# Patient Record
Sex: FEMALE | Race: WHITE | NOT HISPANIC OR LATINO | URBAN - METROPOLITAN AREA
[De-identification: names, ages, dates, MRNs, and addresses within clinical notes are randomized per-mention and may not be internally consistent; named-entity substitution may affect disease eponyms.]

---

## 2025-01-17 ENCOUNTER — APPOINTMENT (OUTPATIENT)
Dept: PRIMARY CARE | Facility: CLINIC | Age: 25
End: 2025-01-17
Payer: COMMERCIAL

## 2025-01-31 ENCOUNTER — OFFICE VISIT (OUTPATIENT)
Dept: PRIMARY CARE | Facility: CLINIC | Age: 25
End: 2025-01-31
Payer: COMMERCIAL

## 2025-01-31 VITALS
HEIGHT: 68 IN | HEART RATE: 68 BPM | SYSTOLIC BLOOD PRESSURE: 102 MMHG | WEIGHT: 158 LBS | DIASTOLIC BLOOD PRESSURE: 68 MMHG | RESPIRATION RATE: 18 BRPM | OXYGEN SATURATION: 99 % | BODY MASS INDEX: 23.95 KG/M2 | TEMPERATURE: 97.9 F

## 2025-01-31 DIAGNOSIS — Z11.1 TUBERCULOSIS SCREENING: ICD-10-CM

## 2025-01-31 DIAGNOSIS — Z01.84 IMMUNITY STATUS TESTING: Primary | ICD-10-CM

## 2025-01-31 PROCEDURE — 3008F BODY MASS INDEX DOCD: CPT | Performed by: NURSE PRACTITIONER

## 2025-01-31 PROCEDURE — 99385 PREV VISIT NEW AGE 18-39: CPT | Performed by: NURSE PRACTITIONER

## 2025-01-31 PROCEDURE — 1036F TOBACCO NON-USER: CPT | Performed by: NURSE PRACTITIONER

## 2025-01-31 RX ORDER — LEVONORGESTREL 52 MG/1
1 INTRAUTERINE DEVICE INTRAUTERINE ONCE
COMMUNITY

## 2025-01-31 ASSESSMENT — ENCOUNTER SYMPTOMS
EYES NEGATIVE: 1
ALLERGIC/IMMUNOLOGIC NEGATIVE: 1
GASTROINTESTINAL NEGATIVE: 1
CARDIOVASCULAR NEGATIVE: 1
MUSCULOSKELETAL NEGATIVE: 1
ENDOCRINE NEGATIVE: 1
HEADACHES: 1
RESPIRATORY NEGATIVE: 1
HEMATOLOGIC/LYMPHATIC NEGATIVE: 1
CONSTITUTIONAL NEGATIVE: 1
PSYCHIATRIC NEGATIVE: 1

## 2025-01-31 ASSESSMENT — LIFESTYLE VARIABLES
HOW MANY STANDARD DRINKS CONTAINING ALCOHOL DO YOU HAVE ON A TYPICAL DAY: 3 OR 4
HOW OFTEN DURING THE LAST YEAR HAVE YOU FAILED TO DO WHAT WAS NORMALLY EXPECTED FROM YOU BECAUSE OF DRINKING: NEVER
HAS A RELATIVE, FRIEND, DOCTOR, OR ANOTHER HEALTH PROFESSIONAL EXPRESSED CONCERN ABOUT YOUR DRINKING OR SUGGESTED YOU CUT DOWN: NO
HOW OFTEN DURING THE LAST YEAR HAVE YOU NEEDED AN ALCOHOLIC DRINK FIRST THING IN THE MORNING TO GET YOURSELF GOING AFTER A NIGHT OF HEAVY DRINKING: NEVER
HOW OFTEN DURING THE LAST YEAR HAVE YOU FOUND THAT YOU WERE NOT ABLE TO STOP DRINKING ONCE YOU HAD STARTED: NEVER
HOW OFTEN DO YOU HAVE SIX OR MORE DRINKS ON ONE OCCASION: LESS THAN MONTHLY
HOW OFTEN DURING THE LAST YEAR HAVE YOU BEEN UNABLE TO REMEMBER WHAT HAPPENED THE NIGHT BEFORE BECAUSE YOU HAD BEEN DRINKING: NEVER
HOW OFTEN DO YOU HAVE A DRINK CONTAINING ALCOHOL: 2-4 TIMES A MONTH
HAVE YOU OR SOMEONE ELSE BEEN INJURED AS A RESULT OF YOUR DRINKING: NO
HOW OFTEN DURING THE LAST YEAR HAVE YOU HAD A FEELING OF GUILT OR REMORSE AFTER DRINKING: NEVER
AUDIT TOTAL SCORE: 4
SKIP TO QUESTIONS 9-10: 0
AUDIT-C TOTAL SCORE: 4

## 2025-01-31 ASSESSMENT — PATIENT HEALTH QUESTIONNAIRE - PHQ9
1. LITTLE INTEREST OR PLEASURE IN DOING THINGS: NOT AT ALL
SUM OF ALL RESPONSES TO PHQ9 QUESTIONS 1 AND 2: 0
2. FEELING DOWN, DEPRESSED OR HOPELESS: NOT AT ALL

## 2025-01-31 ASSESSMENT — PAIN SCALES - GENERAL: PAINLEVEL_OUTOF10: 0-NO PAIN

## 2025-01-31 NOTE — PROGRESS NOTES
"Chief Complaint  Brandi Bey is a 24 y.o. female presenting for \"Annual Exam (Needs Hep B titer, /TB titer needs to be done in July).\"    HPI     Brandi Bey is a 24 y.o. female presenting for a school wellness exam no concerns needs hep b titer and t spot done in July.  Some anxiety, past year or two getting worse.  Sometimes affects life.  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        Past Medical History  There are no active problems to display for this patient.       Medications  Current Outpatient Medications   Medication Instructions    levonorgestrel (Mirena) 20 mcg/24hr IUD 1 each, Once        Surgical History  She has no past surgical history on file.     Social History  She reports that she has never smoked. She has never been exposed to tobacco smoke. She has never used smokeless tobacco. She reports current alcohol use. She reports that she does not use drugs.    Family History  Family History   Problem Relation Name Age of Onset    No Known Problems Mother      No Known Problems Father          Allergies  Patient has no known allergies.    ROS  Review of Systems   Constitutional: Negative.    HENT: Negative.     Eyes: Negative.    Respiratory: Negative.     Cardiovascular: Negative.    Gastrointestinal: Negative.    Endocrine: Negative.    Genitourinary: Negative.    Musculoskeletal: Negative.    Skin: Negative.    Allergic/Immunologic: Negative.    Neurological:  Positive for headaches.   Hematological: Negative.    Psychiatric/Behavioral: Negative.          Last Recorded Vitals  /68 (BP Location: Right arm, Patient Position: Sitting, BP Cuff Size: Small adult)   Pulse 68   Temp 36.6 °C (97.9 °F)   Resp 18   Wt 71.7 kg (158 lb)   SpO2 99%     Physical Exam  Vitals and nursing note reviewed.   Constitutional:       Appearance: Normal appearance.   HENT:      Head: Normocephalic and atraumatic.      Right Ear: Tympanic membrane, ear canal and external ear normal.      Left " Ear: Tympanic membrane, ear canal and external ear normal.      Nose: Nose normal.      Mouth/Throat:      Mouth: Mucous membranes are moist.      Pharynx: Oropharynx is clear.   Eyes:      Extraocular Movements: Extraocular movements intact.      Conjunctiva/sclera: Conjunctivae normal.      Pupils: Pupils are equal, round, and reactive to light.   Neck:      Thyroid: No thyromegaly.   Cardiovascular:      Rate and Rhythm: Normal rate and regular rhythm.      Pulses: Normal pulses.      Heart sounds: Normal heart sounds, S1 normal and S2 normal.   Pulmonary:      Effort: Pulmonary effort is normal.      Breath sounds: Normal breath sounds. No wheezing or rhonchi.   Abdominal:      General: Bowel sounds are normal.      Palpations: Abdomen is soft. There is no mass.      Tenderness: There is no abdominal tenderness. There is no guarding.   Genitourinary:     Comments: Not examined  Musculoskeletal:         General: Normal range of motion.      Cervical back: Normal range of motion.      Right lower leg: No edema.      Left lower leg: No edema.   Lymphadenopathy:      Cervical: No cervical adenopathy.   Skin:     General: Skin is warm and dry.      Capillary Refill: Capillary refill takes less than 2 seconds.      Findings: No rash.   Neurological:      General: No focal deficit present.      Mental Status: She is alert and oriented to person, place, and time. Mental status is at baseline.      Cranial Nerves: Cranial nerves 2-12 are intact. No cranial nerve deficit.      Sensory: Sensation is intact.      Motor: Motor function is intact.      Coordination: Coordination is intact.      Gait: Gait is intact.   Psychiatric:         Mood and Affect: Mood normal.         Behavior: Behavior normal.         Thought Content: Thought content normal.         Judgment: Judgment normal.         Relevant Results      Assessment/Plan   Brandi was seen today for annual exam.  Diagnoses and all orders for this visit:  Immunity status  testing (Primary)  -     Cancel: Rubeola Antibody, IgG; Future  -     Cancel: Mumps Antibody, IgG; Future  -     Cancel: Rubella Antibody, IgG; Future  -     Cancel: Varicella Zoster Antibody, IgG; Future  -     Hepatitis B Surface Antibody; Future  -     Cancel: Rubeola Antibody, IgG  -     Cancel: Mumps Antibody, IgG  -     Cancel: Rubella Antibody, IgG  -     Cancel: Varicella Zoster Antibody, IgG  -     Hepatitis B Surface Antibody  Tuberculosis screening  -     Cancel: T-Spot TB; Future  -     Cancel: T-Spot TB  -     T-Spot TB; Future          COUNSELING      Medication education:              Education:  The patient is counseled regarding potential side-effects of any and all new medications             Understanding:  Patient expressed understanding             Adherence:  No barriers to adherence identified        Emani Bright, APRN-CNP

## 2025-02-01 LAB — HBV SURFACE AB SERPL IA-ACNC: <5 MIU/ML

## 2025-02-10 ENCOUNTER — TELEPHONE (OUTPATIENT)
Dept: PRIMARY CARE | Facility: CLINIC | Age: 25
End: 2025-02-10
Payer: COMMERCIAL

## 2025-02-10 NOTE — TELEPHONE ENCOUNTER
"Pt presents to ED with daughter c/o heart palpitations and feeling "jittery" x 30 minutes. Pt reports hx MI. Family reports recent loss, pt   yesterday.  " Left message for patient to call back and schedule Hep B #1

## 2025-02-10 NOTE — TELEPHONE ENCOUNTER
SEE LAB COMMENTS:     Emani Bright, APRN-CNP  2/3/2025  9:31 AM EST       Hep b neg, needs to start series

## 2025-02-28 ENCOUNTER — APPOINTMENT (OUTPATIENT)
Dept: PRIMARY CARE | Facility: CLINIC | Age: 25
End: 2025-02-28
Payer: COMMERCIAL

## 2025-03-07 ENCOUNTER — CLINICAL SUPPORT (OUTPATIENT)
Dept: PRIMARY CARE | Facility: CLINIC | Age: 25
End: 2025-03-07
Payer: COMMERCIAL

## 2025-03-07 DIAGNOSIS — Z23 ENCOUNTER FOR IMMUNIZATION: ICD-10-CM

## 2025-03-07 PROCEDURE — 90656 IIV3 VACC NO PRSV 0.5 ML IM: CPT | Performed by: NURSE PRACTITIONER

## 2025-03-07 PROCEDURE — 90472 IMMUNIZATION ADMIN EACH ADD: CPT | Performed by: NURSE PRACTITIONER

## 2025-03-07 PROCEDURE — 90471 IMMUNIZATION ADMIN: CPT | Performed by: NURSE PRACTITIONER

## 2025-03-07 PROCEDURE — 90739 HEPB VACC 2/4 DOSE ADULT IM: CPT | Performed by: NURSE PRACTITIONER

## 2025-04-11 ENCOUNTER — APPOINTMENT (OUTPATIENT)
Dept: OBSTETRICS AND GYNECOLOGY | Facility: CLINIC | Age: 25
End: 2025-04-11
Payer: COMMERCIAL

## 2025-04-18 ENCOUNTER — CLINICAL SUPPORT (OUTPATIENT)
Dept: PRIMARY CARE | Facility: CLINIC | Age: 25
End: 2025-04-18
Payer: COMMERCIAL

## 2025-04-18 ENCOUNTER — TELEPHONE (OUTPATIENT)
Dept: PRIMARY CARE | Facility: CLINIC | Age: 25
End: 2025-04-18

## 2025-04-18 DIAGNOSIS — Z23 ENCOUNTER FOR IMMUNIZATION: Primary | ICD-10-CM

## 2025-04-18 PROCEDURE — 90471 IMMUNIZATION ADMIN: CPT | Performed by: NURSE PRACTITIONER

## 2025-04-18 PROCEDURE — 90739 HEPB VACC 2/4 DOSE ADULT IM: CPT | Performed by: NURSE PRACTITIONER

## 2025-04-18 NOTE — TELEPHONE ENCOUNTER
Pt was in today for 2nd Hep B. Pt states she needs another titer that shows immunity. Wants to know how long she should wait until getting done. Please advise and place order.

## 2025-05-16 ENCOUNTER — APPOINTMENT (OUTPATIENT)
Dept: OBSTETRICS AND GYNECOLOGY | Facility: CLINIC | Age: 25
End: 2025-05-16
Payer: COMMERCIAL

## 2025-05-16 VITALS
WEIGHT: 161 LBS | HEIGHT: 67 IN | BODY MASS INDEX: 25.27 KG/M2 | SYSTOLIC BLOOD PRESSURE: 102 MMHG | DIASTOLIC BLOOD PRESSURE: 60 MMHG

## 2025-05-16 DIAGNOSIS — Z11.3 ROUTINE SCREENING FOR STI (SEXUALLY TRANSMITTED INFECTION): ICD-10-CM

## 2025-05-16 DIAGNOSIS — Z01.419 WELL WOMAN EXAM: Primary | ICD-10-CM

## 2025-05-16 DIAGNOSIS — R10.2 PELVIC PAIN: ICD-10-CM

## 2025-05-16 DIAGNOSIS — N92.1 BREAKTHROUGH BLEEDING ASSOCIATED WITH INTRAUTERINE DEVICE (IUD): ICD-10-CM

## 2025-05-16 DIAGNOSIS — Z12.4 SCREENING FOR CERVICAL CANCER: ICD-10-CM

## 2025-05-16 DIAGNOSIS — Z97.5 BREAKTHROUGH BLEEDING ASSOCIATED WITH INTRAUTERINE DEVICE (IUD): ICD-10-CM

## 2025-05-16 PROCEDURE — 99214 OFFICE O/P EST MOD 30 MIN: CPT | Performed by: STUDENT IN AN ORGANIZED HEALTH CARE EDUCATION/TRAINING PROGRAM

## 2025-05-16 PROCEDURE — 87591 N.GONORRHOEAE DNA AMP PROB: CPT

## 2025-05-16 PROCEDURE — 1036F TOBACCO NON-USER: CPT | Performed by: STUDENT IN AN ORGANIZED HEALTH CARE EDUCATION/TRAINING PROGRAM

## 2025-05-16 PROCEDURE — 87661 TRICHOMONAS VAGINALIS AMPLIF: CPT

## 2025-05-16 PROCEDURE — 88142 CYTOPATH C/V THIN LAYER: CPT

## 2025-05-16 PROCEDURE — 87491 CHLMYD TRACH DNA AMP PROBE: CPT

## 2025-05-16 PROCEDURE — 3008F BODY MASS INDEX DOCD: CPT | Performed by: STUDENT IN AN ORGANIZED HEALTH CARE EDUCATION/TRAINING PROGRAM

## 2025-05-16 PROCEDURE — 99395 PREV VISIT EST AGE 18-39: CPT | Performed by: STUDENT IN AN ORGANIZED HEALTH CARE EDUCATION/TRAINING PROGRAM

## 2025-05-16 RX ORDER — NORGESTIMATE AND ETHINYL ESTRADIOL 0.25-0.035
1 KIT ORAL DAILY
Qty: 112 TABLET | Refills: 0 | Status: SHIPPED | OUTPATIENT
Start: 2025-05-16 | End: 2025-08-08

## 2025-05-16 SDOH — ECONOMIC STABILITY: FOOD INSECURITY: WITHIN THE PAST 12 MONTHS, YOU WORRIED THAT YOUR FOOD WOULD RUN OUT BEFORE YOU GOT MONEY TO BUY MORE.: NEVER TRUE

## 2025-05-16 SDOH — ECONOMIC STABILITY: FOOD INSECURITY: WITHIN THE PAST 12 MONTHS, THE FOOD YOU BOUGHT JUST DIDN'T LAST AND YOU DIDN'T HAVE MONEY TO GET MORE.: NEVER TRUE

## 2025-05-16 SDOH — ECONOMIC STABILITY: TRANSPORTATION INSECURITY
IN THE PAST 12 MONTHS, HAS THE LACK OF TRANSPORTATION KEPT YOU FROM MEDICAL APPOINTMENTS OR FROM GETTING MEDICATIONS?: NO

## 2025-05-16 SDOH — ECONOMIC STABILITY: TRANSPORTATION INSECURITY
IN THE PAST 12 MONTHS, HAS LACK OF TRANSPORTATION KEPT YOU FROM MEETINGS, WORK, OR FROM GETTING THINGS NEEDED FOR DAILY LIVING?: NO

## 2025-05-16 ASSESSMENT — LIFESTYLE VARIABLES
SKIP TO QUESTIONS 9-10: 0
HOW MANY STANDARD DRINKS CONTAINING ALCOHOL DO YOU HAVE ON A TYPICAL DAY: 1 OR 2
HOW OFTEN DO YOU HAVE SIX OR MORE DRINKS ON ONE OCCASION: LESS THAN MONTHLY
HOW OFTEN DO YOU HAVE A DRINK CONTAINING ALCOHOL: MONTHLY OR LESS
AUDIT-C TOTAL SCORE: 2

## 2025-05-16 ASSESSMENT — PATIENT HEALTH QUESTIONNAIRE - PHQ9
SUM OF ALL RESPONSES TO PHQ9 QUESTIONS 1 & 2: 0
1. LITTLE INTEREST OR PLEASURE IN DOING THINGS: NOT AT ALL
2. FEELING DOWN, DEPRESSED OR HOPELESS: NOT AT ALL

## 2025-05-16 NOTE — PROGRESS NOTES
Subjective   Brandi Bey is a 24 y.o. y.o. here for a routine exam. Current concerns: mom and grandmother have history of endometriosis and she is concerned she does as well. Her symptoms have included pelvic pain that she first noticed 5-6 months ago. The symptoms occur during her menses however menses are irregular and she is uncertain whether there is a true relationship. Pain is largely on her right side. Notably she has had a hemorrhagic cyst on the right ovary in the past. Notes that pain is present intermittently but does seem to occur whenever she is bleeding. Sometimes so severe that it will cause her to stop her usual activity and go lie down and moving in bed is even painful. Notes some pain with intercourse particularly the day following. Has always had menstrual cramps but this feels different. Also notes some bloating as well and that seems to be a persistent issue rather than related to menses. Menses are fairly heavy. Before she had her Mirena they were manageable and in first two years of Mirena use she had no periods. Mirena placed 2020 and heavy bleeding has been since the start of .  Menses occur at at least monthly intervals but will sometimes have what seems to be a period, then a week of no bleeding, then return of bleeding.     Gynecologic History  No LMP recorded (exact date). (Menstrual status: IUD)..  Contraception: IUD, Mirena  Last Pap: first today.   HPV vaccination: yes      Obstetric History  OB History    Para Term  AB Living   0 0 0 0 0 0   SAB IAB Ectopic Multiple Live Births   0 0 0 0 0       Objective   Physical Exam  Vitals and nursing note reviewed.   Constitutional:       Appearance: Normal appearance.   HENT:      Head: Normocephalic and atraumatic.      Nose: Nose normal.      Mouth/Throat:      Mouth: Mucous membranes are moist.   Eyes:      Extraocular Movements: Extraocular movements intact.      Conjunctiva/sclera: Conjunctivae normal.    Pulmonary:      Effort: Pulmonary effort is normal.   Abdominal:      General: There is no distension.      Palpations: Abdomen is soft. There is no mass.      Comments: Mild tenderness to deep palpation of right side of abdomen and suprapubic region, otherwise normal abdominal exam   Genitourinary:     General: Normal vulva.      Vagina: Normal.      Cervix: Normal.      Uterus: Normal.       Adnexa: Right adnexa normal and left adnexa normal.      Rectum: Normal.      Comments: IUD strings visible, approx 2 cm, mild uterine tenderness  Musculoskeletal:      Cervical back: Normal range of motion.   Skin:     General: Skin is warm and dry.   Neurological:      General: No focal deficit present.      Mental Status: She is alert.   Psychiatric:         Mood and Affect: Mood normal.         Behavior: Behavior normal.         Thought Content: Thought content normal.         Judgment: Judgment normal.         Assessment/Plan   Unremarkable gynecologic exam except as noted.    Pap and STI testing ordered.    In regards to pelvic pain, we reviewed that some of her description of her pain does fit with a diagnosis of endometriosis however some features are less consistent. Advised a strategy initially of high dose tylenol/ibuprofen for management. Discussed definitive diagnosis of endometriosis requires surgical evaluation and that this can be pursued if desired however symptom monitoring is reasonable at this time. She was in agreement with this plan.    In regards to breakthrough bleeding with IUD we discussed that malposition is a potential contributor though suspicion for this is low based on presence of strings on exam. Reviewed that treatment of BTB for a period of time with OCP may help to rebuild uterine lining and reduce BTB. Reviewed as well that if bleeding would return after a period of OCP use could consider continued use of OCP with IUD in place for treatment of bleeding. She was amenable to trial of 3 month  OCP course and then re-assess if bleeding returns. Rx sent with instructions for continuous use.    Given above symptoms as well as history of ovarian cyst will obtain pelvic US for further evaluation.    Follow up for well care or as needed.    Suma Teresa MD

## 2025-05-16 NOTE — PATIENT INSTRUCTIONS
Ibuprofen: 800 mg every 6 hours is the maximum dose.  OK to use smaller doses (400 mg or 600 mg) if these smaller doses are adequate to control pain, but may take up to 800.    Tylenol (acetaminophen): 1000 mg every 6 hours.  Taking at the same time as ibuprofen may actually increase effectiveness of ibuprofen.  1000 mg every 6 hours is the absolute maximum dose.  Even a little bit too much tylenol can cause liver damage so do not exceed this dose.  OK to use smaller doses (325 mg, 500 mg, or 650 mg) if these are adequate to control pain, but can use up to 1000 mg in a single dose.

## 2025-05-19 LAB
C TRACH RRNA SPEC QL NAA+PROBE: NEGATIVE
N GONORRHOEA DNA SPEC QL PROBE+SIG AMP: NEGATIVE
T VAGINALIS RRNA SPEC QL NAA+PROBE: NEGATIVE

## 2025-05-21 DIAGNOSIS — Z01.84 IMMUNITY STATUS TESTING: ICD-10-CM

## 2025-05-23 LAB
C TRACH RRNA SPEC QL NAA+PROBE: NOT DETECTED
HBV SURFACE AB SERPL IA-ACNC: >1000 MIU/ML
HBV SURFACE AG SERPL QL IA: NORMAL
HCV AB SERPL QL IA: NORMAL
HIV 1+2 AB+HIV1 P24 AG SERPL QL IA: NORMAL
N GONORRHOEA RRNA SPEC QL NAA+PROBE: NOT DETECTED
QUEST GC CT AMPLIFIED (ALWAYS MESSAGE): NORMAL
T PALLIDUM AB SER QL IA: NORMAL
T VAGINALIS RRNA SPEC QL NAA+PROBE: NOT DETECTED

## 2025-05-28 LAB
CYTOLOGY CMNT CVX/VAG CYTO-IMP: NORMAL
HBV SURFACE AG SERPL QL IA: NORMAL
HCV AB SERPL QL IA: NORMAL
HIV 1+2 AB+HIV1 P24 AG SERPL QL IA: NORMAL
LAB AP CONTRACEPTIVE HISTORY: NORMAL
LAB AP HPV HR: NORMAL
LAB AP PAP ADDITIONAL TESTS: NORMAL
LABORATORY COMMENT REPORT: NORMAL
LABORATORY COMMENT REPORT: NORMAL
PATH REPORT.TOTAL CANCER: NORMAL
T PALLIDUM AB SER QL IA: NEGATIVE

## 2025-07-01 DIAGNOSIS — Z11.1 TUBERCULOSIS SCREENING: ICD-10-CM

## 2025-07-10 LAB
IGNF NEG CNTRL BLD: NORMAL
M TB IFN-G BLD-IMP: NEGATIVE
MITOGEN IGNF.SPOT COUNT BLD: NORMAL
QUEST PANEL A SPOT COUNT: 0
QUEST PANEL B SPOT COUNT: 0

## 2025-07-11 ENCOUNTER — HOSPITAL ENCOUNTER (OUTPATIENT)
Dept: RADIOLOGY | Facility: HOSPITAL | Age: 25
Discharge: HOME | End: 2025-07-11
Payer: COMMERCIAL

## 2025-07-11 DIAGNOSIS — Z97.5 BREAKTHROUGH BLEEDING ASSOCIATED WITH INTRAUTERINE DEVICE (IUD): ICD-10-CM

## 2025-07-11 DIAGNOSIS — R10.2 PELVIC PAIN: ICD-10-CM

## 2025-07-11 DIAGNOSIS — N92.1 BREAKTHROUGH BLEEDING ASSOCIATED WITH INTRAUTERINE DEVICE (IUD): ICD-10-CM

## 2025-07-11 PROCEDURE — 76856 US EXAM PELVIC COMPLETE: CPT

## 2025-08-01 DIAGNOSIS — N92.1 BREAKTHROUGH BLEEDING ASSOCIATED WITH INTRAUTERINE DEVICE (IUD): ICD-10-CM

## 2025-08-01 DIAGNOSIS — Z97.5 BREAKTHROUGH BLEEDING ASSOCIATED WITH INTRAUTERINE DEVICE (IUD): ICD-10-CM

## 2025-08-02 RX ORDER — NORGESTIMATE AND ETHINYL ESTRADIOL 0.25-0.035
1 KIT ORAL DAILY
Qty: 112 TABLET | Refills: 3 | Status: SHIPPED | OUTPATIENT
Start: 2025-08-02 | End: 2026-08-02